# Patient Record
Sex: MALE | Race: WHITE | URBAN - METROPOLITAN AREA
[De-identification: names, ages, dates, MRNs, and addresses within clinical notes are randomized per-mention and may not be internally consistent; named-entity substitution may affect disease eponyms.]

---

## 2018-03-07 LAB
BASOPHILS ABSOLUTE: 0.03 /ΜL
BASOPHILS RELATIVE PERCENT: 0.8 %
EOSINOPHILS ABSOLUTE: 0.07 /ΜL
EOSINOPHILS RELATIVE PERCENT: 1.9 %
HCT VFR BLD CALC: 35.8 % (ref 41–53)
HEMOGLOBIN: 12.1 G/DL (ref 13.5–17.5)
LYMPHOCYTES ABSOLUTE: 1.73 /ΜL
LYMPHOCYTES RELATIVE PERCENT: 46.3 %
MCH RBC QN AUTO: 28.9 PG
MCHC RBC AUTO-ENTMCNC: 33.8 G/DL
MCV RBC AUTO: 85.6 FL
MONOCYTES ABSOLUTE: 0.25 /ΜL
MONOCYTES RELATIVE PERCENT: 6.7 %
NEUTROPHILS ABSOLUTE: ABNORMAL /ΜL
NEUTROPHILS RELATIVE PERCENT: ABNORMAL %
PDW BLD-RTO: 12.7 %
PLATELET # BLD: 222 K/ΜL
PMV BLD AUTO: 9.4 FL
RBC # BLD: 4.18 10^6/ΜL
WBC # BLD: 3.74 10^3/ML

## 2018-03-13 ENCOUNTER — TELEPHONE (OUTPATIENT)
Dept: FAMILY MEDICINE CLINIC | Age: 28
End: 2018-03-13

## 2018-07-28 RX ORDER — CIPROFLOXACIN 500 MG/1
500 TABLET, FILM COATED ORAL 2 TIMES DAILY
Qty: 10 TABLET | Refills: 0 | Status: SHIPPED | OUTPATIENT
Start: 2018-07-28 | End: 2018-08-02

## 2018-07-30 ENCOUNTER — OFFICE VISIT (OUTPATIENT)
Dept: FAMILY MEDICINE CLINIC | Age: 28
End: 2018-07-30

## 2018-07-30 VITALS
HEART RATE: 76 BPM | OXYGEN SATURATION: 98 % | WEIGHT: 175 LBS | HEIGHT: 70 IN | SYSTOLIC BLOOD PRESSURE: 110 MMHG | BODY MASS INDEX: 25.05 KG/M2 | DIASTOLIC BLOOD PRESSURE: 74 MMHG

## 2018-07-30 DIAGNOSIS — Z72.51 HIGH RISK SEXUAL BEHAVIOR: ICD-10-CM

## 2018-07-30 DIAGNOSIS — F41.1 GENERALIZED ANXIETY DISORDER: ICD-10-CM

## 2018-07-30 DIAGNOSIS — R30.0 DYSURIA: Primary | ICD-10-CM

## 2018-07-30 DIAGNOSIS — F11.11 HISTORY OF HEROIN ABUSE (HCC): ICD-10-CM

## 2018-07-30 LAB
AMPHETAMINE SCREEN, URINE: POSITIVE
BARBITURATE SCREEN URINE: ABNORMAL
BENZODIAZEPINE SCREEN, URINE: ABNORMAL
BUPRENORPHINE URINE: POSITIVE
CANNABINOID SCREEN URINE: ABNORMAL
COCAINE METABOLITE SCREEN URINE: ABNORMAL
Lab: ABNORMAL
METHADONE SCREEN, URINE: ABNORMAL
OPIATE SCREEN URINE: ABNORMAL
OXYCODONE URINE: ABNORMAL
PH UA: 6
PHENCYCLIDINE SCREEN URINE: ABNORMAL
PROPOXYPHENE SCREEN: ABNORMAL

## 2018-07-30 PROCEDURE — 36415 COLL VENOUS BLD VENIPUNCTURE: CPT | Performed by: FAMILY MEDICINE

## 2018-07-30 PROCEDURE — 99203 OFFICE O/P NEW LOW 30 MIN: CPT | Performed by: FAMILY MEDICINE

## 2018-07-30 PROCEDURE — G0444 DEPRESSION SCREEN ANNUAL: HCPCS | Performed by: FAMILY MEDICINE

## 2018-07-30 ASSESSMENT — PATIENT HEALTH QUESTIONNAIRE - PHQ9
6. FEELING BAD ABOUT YOURSELF - OR THAT YOU ARE A FAILURE OR HAVE LET YOURSELF OR YOUR FAMILY DOWN: 3
4. FEELING TIRED OR HAVING LITTLE ENERGY: 3
8. MOVING OR SPEAKING SO SLOWLY THAT OTHER PEOPLE COULD HAVE NOTICED. OR THE OPPOSITE, BEING SO FIGETY OR RESTLESS THAT YOU HAVE BEEN MOVING AROUND A LOT MORE THAN USUAL: 1
SUM OF ALL RESPONSES TO PHQ9 QUESTIONS 1 & 2: 6
3. TROUBLE FALLING OR STAYING ASLEEP: 3
2. FEELING DOWN, DEPRESSED OR HOPELESS: 3
7. TROUBLE CONCENTRATING ON THINGS, SUCH AS READING THE NEWSPAPER OR WATCHING TELEVISION: 3
5. POOR APPETITE OR OVEREATING: 3
SUM OF ALL RESPONSES TO PHQ QUESTIONS 1-9: 22
1. LITTLE INTEREST OR PLEASURE IN DOING THINGS: 3
9. THOUGHTS THAT YOU WOULD BE BETTER OFF DEAD, OR OF HURTING YOURSELF: 0

## 2018-07-30 NOTE — PROGRESS NOTES
Heroin Abuse - he had an overdose 3/28/2018 [facility] [place] 2[place] OF [facility]. He claims not using. We havea  40+ min conversation of the driving variables    # high risk sexual behavior - other than dysuria. Objective   Wt Readings from Last 3 Encounters:   07/30/18 175 lb (79.4 kg)   02/21/16 170 lb (77.1 kg)   04/20/12 170 lb (77.1 kg)       A&O  /74   Pulse 76   Ht 5' 10\" (1.778 m)   Wt 175 lb (79.4 kg)   SpO2 98%   BMI 25.11 kg/m²    Somewhat fidgety with intense open lid start and mid fixed pupils  Eyes no scleral icterus  Skin no rash no jaundice mult tattoos  Neck no TMG no LAD  Car reg no MGR  Lungs CTA  abd benign soft  Ext no pitting edema  Psych: Judgement and insight are intact, no pressured speech; no psychomotor retardation; mild gitation; affect and mood congruent     Diagnosis Orders   1. Dysuria  URINE CULTURE    Hepatitis Panel, Acute    RPR Reflex to Titer and TPPA    Herpes simplex virus (HSV) I/II antibodies IgG & IgM w/ reflex    HIV Screen    finish cipro sent cx educated on rarity of male UTI advised anal sex may be trigger   2. Generalized anxiety disorder  Ambulatory referral to Psychology    trial sertraline refused to rx xanax due to benzo addiction risk prior H addiction; advised to schedule with Dr. Cricket Kimbrough and get [facility] enrollment provided info   3. History of heroin abuse  Drug Screen Multi Urine With Bup    urine drug screen sent and long discussion of neeed to treat root issues   4. High risk sexual behavior      STD tests sent     Orders Placed This Encounter   Procedures    URINE CULTURE     Order Specific Question:   Specify (ex-cath, midstream, cysto, etc)?      Answer:   no    Hepatitis Panel, Acute    RPR Reflex to Titer and TPPA    Herpes simplex virus (HSV) I/II antibodies IgG & IgM w/ reflex    Drug Screen Multi Urine With Bup    HIV Screen    Ambulatory referral to Psychology     Referral Priority:   Routine     Referral Type:   Psychiatric     Requested Specialty: Psychology     Number of Visits Requested:   1

## 2018-07-31 PROBLEM — F11.20 HEROIN ADDICTION (HCC): Status: ACTIVE | Noted: 2018-07-31

## 2018-07-31 PROBLEM — B18.2 CHRONIC HEPATITIS C WITHOUT HEPATIC COMA (HCC): Status: ACTIVE | Noted: 2018-07-31

## 2018-07-31 LAB
HAV IGM SER IA-ACNC: ABNORMAL
HEPATITIS B CORE IGM ANTIBODY: ABNORMAL
HEPATITIS B SURFACE ANTIGEN INTERPRETATION: ABNORMAL
HEPATITIS C ANTIBODY INTERPRETATION: REACTIVE
HIV AG/AB: NORMAL
HIV ANTIGEN: NORMAL
HIV-1 ANTIBODY: NORMAL
HIV-2 AB: NORMAL

## 2018-08-01 LAB — URINE CULTURE, ROUTINE: NORMAL

## 2018-08-02 LAB
HERPES TYPE 1/2 IGM COMBINED: 0.42 IV
HERPES TYPE I/II IGG COMBINED: 0.33 IV

## 2018-08-06 ENCOUNTER — TELEPHONE (OUTPATIENT)
Dept: FAMILY MEDICINE CLINIC | Age: 28
End: 2018-08-06

## 2018-08-06 NOTE — TELEPHONE ENCOUNTER
I spoke to Daphney Lau and she wanted to know if she Traci Rivera) should informed Doug's girlfriend about being Hep C positive, I said yes. She states that Myra Zelaya and his girlfriend were using together ( Heroin) and they have a 3 month baby ( mom was shooting heroin during her pregnancy)  After baby was born, spent some time in the NICU at [facility]. I advised Daphney Lau that baby needs to be checked as well.

## 2018-08-06 NOTE — TELEPHONE ENCOUNTER
Pt sister Yuki Alxeis (and Helena Busby) called stating that she was just speaking to [place]. She said she had additional information she wanted to discuss with her. Please call back at [phone].

## 2018-08-13 ENCOUNTER — TELEPHONE (OUTPATIENT)
Dept: FAMILY MEDICINE CLINIC | Age: 28
End: 2018-08-13

## 2018-08-13 NOTE — TELEPHONE ENCOUNTER
Received lab results. Knows pt needs to be treated for hepatitis. States he is incarcerated right now. Unsure how long he is going to be in intermediate. Sister spoke with intermediate, and was informed that he could be treated if treatment is arranged. Please advise.  Please call pt's sister Ceci Croft back at [phone]

## 2018-08-13 NOTE — TELEPHONE ENCOUNTER
He would need to see GI doctor to be treated. I have no idea his insurance status. The treatment is extremely expensive. Medicaid covers if he can get on medicaid. No point in pursuing unless he can get on medicaid.